# Patient Record
Sex: MALE | Race: WHITE | NOT HISPANIC OR LATINO
[De-identification: names, ages, dates, MRNs, and addresses within clinical notes are randomized per-mention and may not be internally consistent; named-entity substitution may affect disease eponyms.]

---

## 2019-02-15 ENCOUNTER — APPOINTMENT (OUTPATIENT)
Dept: GERIATRICS | Facility: CLINIC | Age: 74
End: 2019-02-15
Payer: MEDICARE

## 2019-02-15 VITALS
HEART RATE: 59 BPM | HEIGHT: 73 IN | WEIGHT: 193.19 LBS | TEMPERATURE: 96.7 F | RESPIRATION RATE: 15 BRPM | OXYGEN SATURATION: 96 % | BODY MASS INDEX: 25.6 KG/M2 | SYSTOLIC BLOOD PRESSURE: 138 MMHG | DIASTOLIC BLOOD PRESSURE: 60 MMHG

## 2019-02-15 DIAGNOSIS — I73.9 PERIPHERAL VASCULAR DISEASE, UNSPECIFIED: ICD-10-CM

## 2019-02-15 LAB
BASOPHILS # BLD AUTO: 0.02 K/UL
BASOPHILS NFR BLD AUTO: 0.2 %
EOSINOPHIL # BLD AUTO: 0.13 K/UL
EOSINOPHIL NFR BLD AUTO: 1.3 %
HBA1C MFR BLD HPLC: 6.4 %
HCT VFR BLD CALC: 38 %
HGB BLD-MCNC: 11.8 G/DL
IMM GRANULOCYTES NFR BLD AUTO: 0.1 %
LYMPHOCYTES # BLD AUTO: 2.11 K/UL
LYMPHOCYTES NFR BLD AUTO: 21.5 %
MAN DIFF?: NORMAL
MCHC RBC-ENTMCNC: 29.6 PG
MCHC RBC-ENTMCNC: 31.1 GM/DL
MCV RBC AUTO: 95.5 FL
MONOCYTES # BLD AUTO: 0.6 K/UL
MONOCYTES NFR BLD AUTO: 6.1 %
NEUTROPHILS # BLD AUTO: 6.96 K/UL
NEUTROPHILS NFR BLD AUTO: 70.8 %
PLATELET # BLD AUTO: 201 K/UL
RBC # BLD: 3.98 M/UL
RBC # FLD: 15.8 %
WBC # FLD AUTO: 9.83 K/UL

## 2019-02-15 PROCEDURE — 99204 OFFICE O/P NEW MOD 45 MIN: CPT | Mod: GC

## 2019-02-15 RX ORDER — INSULIN DETEMIR 100 [IU]/ML
100 INJECTION, SOLUTION SUBCUTANEOUS
Refills: 0 | Status: DISCONTINUED | COMMUNITY
Start: 2019-02-15 | End: 2019-02-15

## 2019-02-15 NOTE — PHYSICAL EXAM
[General Appearance - In No Acute Distress] : in no acute distress [General Appearance - Well Nourished] : well nourished [Sclera] : the sclera and conjunctiva were normal [PERRL With Normal Accommodation] : pupils were equal in size, round, and reactive to light [Extraocular Movements] : extraocular movements were intact [Normal Oral Mucosa] : normal oral mucosa [Outer Ear] : the ears and nose were normal in appearance [] : no respiratory distress [Respiration, Rhythm And Depth] : normal respiratory rhythm and effort [Exaggerated Use Of Accessory Muscles For Inspiration] : no accessory muscle use [Auscultation Breath Sounds / Voice Sounds] : lungs were clear to auscultation bilaterally [Bowel Sounds] : normal bowel sounds [Abdomen Soft] : soft [Abdomen Tenderness] : non-tender [No CVA Tenderness] : no ~M costovertebral angle tenderness [Nail Clubbing] : no clubbing  or cyanosis of the fingernails [Involuntary Movements] : no involuntary movements were seen [Skin Color & Pigmentation] : normal skin color and pigmentation [No Focal Deficits] : no focal deficits [FreeTextEntry1] : reduced arm swing, mild shuffling gait

## 2019-02-19 LAB
25(OH)D3 SERPL-MCNC: 37.5 NG/ML
ALBUMIN SERPL ELPH-MCNC: 4.4 G/DL
ALP BLD-CCNC: 153 U/L
ALT SERPL-CCNC: 25 U/L
ANION GAP SERPL CALC-SCNC: 16 MMOL/L
AST SERPL-CCNC: 35 U/L
BILIRUB SERPL-MCNC: 0.5 MG/DL
BUN SERPL-MCNC: 20 MG/DL
CALCIUM SERPL-MCNC: 9.5 MG/DL
CHLORIDE SERPL-SCNC: 106 MMOL/L
CO2 SERPL-SCNC: 20 MMOL/L
CREAT SERPL-MCNC: 1.88 MG/DL
FOLATE SERPL-MCNC: 19.1 NG/ML
GLUCOSE SERPL-MCNC: 59 MG/DL
LDLC SERPL DIRECT ASSAY-MCNC: 40 MG/DL
POTASSIUM SERPL-SCNC: 4.8 MMOL/L
PROT SERPL-MCNC: 7.4 G/DL
SODIUM SERPL-SCNC: 142 MMOL/L
T PALLIDUM AB SER QL IA: NEGATIVE
TSH SERPL-ACNC: 3.76 UIU/ML
VIT B12 SERPL-MCNC: 404 PG/ML

## 2019-02-22 ENCOUNTER — APPOINTMENT (OUTPATIENT)
Dept: GERIATRICS | Facility: CLINIC | Age: 74
End: 2019-02-22
Payer: MEDICARE

## 2019-02-22 VITALS
DIASTOLIC BLOOD PRESSURE: 70 MMHG | HEART RATE: 59 BPM | SYSTOLIC BLOOD PRESSURE: 140 MMHG | TEMPERATURE: 97.4 F | BODY MASS INDEX: 25.07 KG/M2 | OXYGEN SATURATION: 96 % | WEIGHT: 190 LBS

## 2019-02-22 PROCEDURE — 99215 OFFICE O/P EST HI 40 MIN: CPT

## 2019-02-24 ENCOUNTER — TRANSCRIPTION ENCOUNTER (OUTPATIENT)
Age: 74
End: 2019-02-24

## 2019-03-07 ENCOUNTER — TRANSCRIPTION ENCOUNTER (OUTPATIENT)
Age: 74
End: 2019-03-07

## 2019-03-15 ENCOUNTER — APPOINTMENT (OUTPATIENT)
Dept: GERIATRICS | Facility: CLINIC | Age: 74
End: 2019-03-15
Payer: MEDICARE

## 2019-03-15 VITALS
WEIGHT: 187.2 LBS | BODY MASS INDEX: 24.7 KG/M2 | HEART RATE: 65 BPM | SYSTOLIC BLOOD PRESSURE: 136 MMHG | TEMPERATURE: 97.5 F | OXYGEN SATURATION: 95 % | DIASTOLIC BLOOD PRESSURE: 50 MMHG

## 2019-03-15 PROCEDURE — 99214 OFFICE O/P EST MOD 30 MIN: CPT | Mod: GC

## 2019-03-15 NOTE — ASSESSMENT
[FreeTextEntry1] : Patient is a 72 y/o M w/ a PMHx of T2DM, HTN, HLD, and anxiety who presents to clinic for follow up.\par \par # T2DM \par - FS recorded at patient's [facility] were reviewed and were grossly stable. No episodes of hypoglycemia were seen \par - Will c/w current management for now (Metformin 500mg BID) and will review patient's FS again at his next visit\par - Eduardo check Hemoglobin A1c today\par \par # Short-term memory impairment\par - Vitamin B12, Folate, and TSH were WNL and RPR was negative in 2/2019.\par - Patient is currently in the memory unit at his [facility] which has been helpful per the nurse manager at the facility\par - Will start Donepezil 5mg QHS\par \par # Weight loss\par - Patient's sisters expressed concern regarding patient's recent weight loss. Spoke with patient's [facility]'s nurse manager who reports that patient can forget to eat at times. No other B-type symptoms were reported\par - Strongly encouraged PO intake and to monitor if patient is forgetting meals\par - Will check CBC, CMP, Ferritin, Iron studies, and MMA/Homocysteine levels\par - Requested that patient RTC in 4 weeks in order to monitor his progress\par \par # Depression/anxiety\par - Patient and his sisters are unsure who prescribes patient's anxiety/depression medications. His sisters believe it was prescribed during a hospitalization and he has remained on these medications since discharge.\par - Will stop Bupropion and start Escitalopram 5mg QD. C/w Buspirone\par \par # Occasional LE pain\par - Can be associated with an unsteady gait. Physical Therapy referral was provided\par - Fall precautions were strictly emphasized.\par - Recommended that patient can take Tylenol PRN for pain\par \par # HTN - Acceptable\par - C/w Lisinopril and Toprol XL\par \par RTC in 4 weeks for follow-up

## 2019-03-15 NOTE — PHYSICAL EXAM
[General Appearance - Alert] : alert [General Appearance - In No Acute Distress] : in no acute distress [Sclera] : the sclera and conjunctiva were normal [Extraocular Movements] : extraocular movements were intact [No Oral Pallor] : no oral pallor [No Oral Cyanosis] : no oral cyanosis [Neck Appearance] : the appearance of the neck was normal [Respiration, Rhythm And Depth] : normal respiratory rhythm and effort [Exaggerated Use Of Accessory Muscles For Inspiration] : no accessory muscle use [Nail Clubbing] : no clubbing  or cyanosis of the fingernails [Involuntary Movements] : no involuntary movements were seen [] : no rash [FreeTextEntry1] : short term memory impairment

## 2019-03-15 NOTE — ASSESSMENT
[FreeTextEntry1] : dm2:  continue off levemir.  decrease metforminto 500mg bid for crcl.  f/u sugars next week, and visit in 3 weeks. \par \par memory impairment:  \par mri brain from 2/2018:no NPH,pronounced volume loss bilateral temporal lobes and superior aspect of the frontal and parietal lobes.\par -short term memory loss.  normal TSH, B12, neg rpr.\par -also with depression and anxiety\par -discussed possible referral to neurology for additional imaging to assist with distinguishing memory loss from depression vs dementia.  previous mri is c/w volume loss.  also recent adjustment in his dm2 medications is profound hypoglycemic episodes; now improved.\par \par htn: controlled c/w current meds\par tobacco use: counselled on smoking cessation,  remains on bupropion for the indication of smoking cessation, however only on 150mg once daily and has remained on since july (making it more likely taking for depression)\par \par discussed not keeping smokes in his room, for concern about smoking in the bathroom\par advised not to leave the building unless accompanied by family member for safety concerns.\par \par \par \par \par

## 2019-03-15 NOTE — PHYSICAL EXAM
[General Appearance - Alert] : alert [General Appearance - In No Acute Distress] : in no acute distress [Sclera] : the sclera and conjunctiva were normal [Extraocular Movements] : extraocular movements were intact [Outer Ear] : the ears and nose were normal in appearance [Oropharynx] : The oropharynx was normal [Neck Appearance] : the appearance of the neck was normal [Respiration, Rhythm And Depth] : normal respiratory rhythm and effort [Exaggerated Use Of Accessory Muscles For Inspiration] : no accessory muscle use [Auscultation Breath Sounds / Voice Sounds] : lungs were clear to auscultation bilaterally [Heart Rate And Rhythm] : heart rate was normal and rhythm regular [Heart Sounds] : normal S1 and S2 [Bowel Sounds] : normal bowel sounds [Abdomen Soft] : soft [Abdomen Tenderness] : non-tender [No Spinal Tenderness] : no spinal tenderness [Nail Clubbing] : no clubbing  or cyanosis of the fingernails [Involuntary Movements] : no involuntary movements were seen [Skin Color & Pigmentation] : normal skin color and pigmentation [] : no rash [No Focal Deficits] : no focal deficits [FreeTextEntry1] : Short term memory impairment, Calm and cooperative

## 2019-03-15 NOTE — HISTORY OF PRESENT ILLNESS
[FreeTextEntry1] : 74 y/o male presents for dm2 follow up. Pt sisters are accompanying him (Amee [phone])and Sintia [phone]). Pt currently resides in Assisted living home in [place] at [place].\par \par last visit with hypoglycemia, we stopped levemir, today follow up blood sugars:\par \par 160,200,133,182, 89, 121, 148 remains on metformin 1000mg bid.\par \par spoke with Wandy of [facility]  who reports they have provided an aide to accompany patient when he leaves the building to smoke outside as he walked to the store during the night in the cold and a stranger had to bring him back to the building. \par the patient cannot remember the aide that was with him this morning.  he has a habit of walking to the store to buy a pack of smokes.\par RD also reports patient has had smoking in his bathroom at 2am.  patient denies this.  Unclear if he has pack of smokes in his room.  \par \par \par \par

## 2019-03-15 NOTE — HISTORY OF PRESENT ILLNESS
[FreeTextEntry1] : Patient is a 74 y/o M w/ a PMHx of T2DM, HTN, HLD, and anxiety who presents to clinic for follow up. Patient is accompanied by his sisters (Amee [phone]) and Sintia [phone]). Pt currently resides in Assisted living home at [place].\par \par Patient was last seen in clinic on 2/22/19. Patient's diabetes regimen was adjusted last month due to episodes of hypoglycemia. His Insulin (Levemir) was discontinued and he is taking Metformin 500mg BID. Patient's AM FS are checked at his [facility], but patient and his sister are unsure what they were. Patient's facility faxed a Hemoglobin A1c result from 2/20/19 which was 6.4%.\par \par In addition, patient was transferred to a Memory Unit in his [facility] recently due to episodes of wandering and safety concerns. Patient's daughters reports that patient has not wandered since the transfer, but he continues to have short-term memory difficulty. He states that he is adjusting well to the new unit and he has been able to sleep well. Patient continues to smoke since his last visit. There is a space available in his new unit where smoking is allowed. He states that he smokes ~ 3-4 cigarettes per day.\par \par Furthermore, patient's sister report that patient has been having weight loss over the last few months. They state that his appetite has been diminished lately. Patient denies any recent nausea, vomiting, diarrhea, or abdominal pain. No fever, chills, diaphoresis, or abdominal pain.\par \par On ROS, patient reports occasional pain in his LEs. Per patient's sisters, this has been a chronic issue. Patient reports that this pain can usually occur after prolonged periods of sitting or prolonged periods of walking. No history of falls or trauma, but his gait can occasionally be unsteady.\par \par Spoke with the Nurse Manager (Alyssa) at patient's [facility]. Patient's recent AM FS were faxed to this clinic and they fluctuated between 130s-190s (highest = 196). No hypoglycemia noted. In addition, Alyssa reports that patient has been transitioning well to his new unit. She states that patient has been more active and participating more. Discussed patient's sisters' concerns regarding weight loss. Per Alyssa, patient appears to forget to eat and occasionally needs to be reminded to do so. Alyssa reports no other concerns.

## 2019-03-18 LAB
ALBUMIN SERPL ELPH-MCNC: 4.6 G/DL
ALP BLD-CCNC: 147 U/L
ALT SERPL-CCNC: 13 U/L
ANION GAP SERPL CALC-SCNC: 14 MMOL/L
AST SERPL-CCNC: 20 U/L
BASOPHILS # BLD AUTO: 0.04 K/UL
BASOPHILS NFR BLD AUTO: 0.4 %
BILIRUB SERPL-MCNC: 0.5 MG/DL
BUN SERPL-MCNC: 19 MG/DL
CALCIUM SERPL-MCNC: 9.9 MG/DL
CHLORIDE SERPL-SCNC: 103 MMOL/L
CO2 SERPL-SCNC: 25 MMOL/L
CREAT SERPL-MCNC: 2.1 MG/DL
EOSINOPHIL # BLD AUTO: 0.17 K/UL
EOSINOPHIL NFR BLD AUTO: 1.9 %
FERRITIN SERPL-MCNC: 77 NG/ML
GLUCOSE SERPL-MCNC: 130 MG/DL
HBA1C MFR BLD HPLC: 7 %
HCT VFR BLD CALC: 39.7 %
HGB BLD-MCNC: 12.3 G/DL
IMM GRANULOCYTES NFR BLD AUTO: 0.3 %
IRON SATN MFR SERPL: 13 %
IRON SERPL-MCNC: 52 UG/DL
LYMPHOCYTES # BLD AUTO: 2.19 K/UL
LYMPHOCYTES NFR BLD AUTO: 24.4 %
MAN DIFF?: NORMAL
MCHC RBC-ENTMCNC: 29.3 PG
MCHC RBC-ENTMCNC: 31 GM/DL
MCV RBC AUTO: 94.5 FL
MONOCYTES # BLD AUTO: 0.85 K/UL
MONOCYTES NFR BLD AUTO: 9.5 %
NEUTROPHILS # BLD AUTO: 5.71 K/UL
NEUTROPHILS NFR BLD AUTO: 63.5 %
PLATELET # BLD AUTO: 168 K/UL
POTASSIUM SERPL-SCNC: 4.8 MMOL/L
PROT SERPL-MCNC: 7.4 G/DL
RBC # BLD: 4.2 M/UL
RBC # FLD: 16.2 %
SODIUM SERPL-SCNC: 142 MMOL/L
TIBC SERPL-MCNC: 387 UG/DL
UIBC SERPL-MCNC: 335 UG/DL
WBC # FLD AUTO: 8.99 K/UL

## 2019-03-21 LAB
HOMOCYSTEINE LEVEL: 28.7 UMOL/L
METHYLMALONIC ACID LEVEL: 366 NMOL/L

## 2019-04-10 ENCOUNTER — RX RENEWAL (OUTPATIENT)
Age: 74
End: 2019-04-10

## 2019-04-12 ENCOUNTER — APPOINTMENT (OUTPATIENT)
Dept: GERIATRICS | Facility: CLINIC | Age: 74
End: 2019-04-12
Payer: MEDICARE

## 2019-04-12 VITALS
RESPIRATION RATE: 15 BRPM | OXYGEN SATURATION: 97 % | HEART RATE: 54 BPM | HEIGHT: 73 IN | SYSTOLIC BLOOD PRESSURE: 110 MMHG | BODY MASS INDEX: 24.97 KG/M2 | TEMPERATURE: 97.6 F | WEIGHT: 188.38 LBS | DIASTOLIC BLOOD PRESSURE: 60 MMHG

## 2019-04-12 PROCEDURE — 99214 OFFICE O/P EST MOD 30 MIN: CPT

## 2019-04-12 RX ORDER — ESCITALOPRAM OXALATE 5 MG/1
5 TABLET ORAL DAILY
Qty: 30 | Refills: 3 | Status: DISCONTINUED | COMMUNITY
Start: 2019-03-15 | End: 2019-04-12

## 2019-04-12 NOTE — REVIEW OF SYSTEMS
[Fever] : no fever [Chills] : no chills [Eye Pain] : no eye pain [Sore Throat] : no sore throat [Nasal Discharge] : no nasal discharge [Red Eyes] : eyes not red [Chest Pain] : no chest pain [Palpitations] : no palpitations [Shortness Of Breath] : no shortness of breath [Vomiting] : no vomiting [Cough] : no cough [Abdominal Pain] : no abdominal pain [Limb Swelling] : no limb swelling [Skin Lesions] : no skin lesions [Diarrhea] : no diarrhea [Fainting] : no fainting [Itching] : no itching [Dizziness] : no dizziness [de-identified] : + Short-term memory difficulty [FreeTextEntry9] : + Occasional LE pain [FreeTextEntry2] : No diaphoresis, + Weight loss

## 2019-04-12 NOTE — HISTORY OF PRESENT ILLNESS
[FreeTextEntry1] : Patient is a 72 y/o M w/ a PMHx of T2DM, HTN, HLD, and anxiety who presents to clinic for follow up. Patient is accompanied by his sisters (Amee [phone]) and Sintia [phone]). Pt currently resides in Assisted living home at [facility].\par \par dm2: patients alc was 7.0% in feb.  awaiting correction to fax results of his finger sticks.\par \par patient remains in the Memory Unit in his [facility] due to episodes of wandering and safety concerns.  smoking less now which is allowed in the facility under supervision.  sisters reports they have not bought him any additional cigarettes since he moved in.\par \par weight has been stable.  denies ab pain, nausea, or decreased appetite.\par \par continues with PT at the [facility].\par \tiffany Spoke with the Nurse Manager (Alyssa) at patient's [facility].

## 2019-04-12 NOTE — ASSESSMENT
[FreeTextEntry1] : Patient is a 72 y/o M w/ a PMHx of T2DM, HTN, HLD, and anxiety who presents to clinic for follow up.\par \par # Depression/anxiety\par - worsening mood since last visit with change in antidepressants.  plan to taper off lexapro (qod x 10days then stop) and add back bupropion 150mg.   C/w Buspirone\par \par # T2DM \par - FS recorded at patient's snf were reviewed and were grossly stable. No episodes of hypoglycemia were seen \par - Will c/w current management for now (Metformin 500mg BID) and will review patient's FS again at his next visit\par - last Hemoglobin A1c 7.0\par \par # Dementia:  MMSE 12/30 on 2/15/19\par - Vitamin B12, Folate, and TSH were WNL and RPR was negative in 2/2019.\par - Patient is currently in the memory unit at his snf for wandering and safety\par - Continue with Donepezil 5mg QHS\par \par # Weight loss\par - Patient's sisters expressed concern regarding patient's recent weight loss. has been stable since last visit. suspect improved eating on the memory unit.  will continue to monitor.  \par \par \par # Occasional LE pain and slow unsteady gait:\par - Can be associated with an unsteady gait. Physical Therapy referral was provided\par - Fall precautions\par - Recommended that patient can take Tylenol PRN for pain\par \par # HTN -lower side today and hr of 54.  \par - decrease metoprolol from 50mg to 25mg\par - C/w Lisinopril \par \par RTC in 8 weeks for follow-up

## 2019-04-12 NOTE — PHYSICAL EXAM
[General Appearance - Alert] : alert [Sclera] : the sclera and conjunctiva were normal [General Appearance - In No Acute Distress] : in no acute distress [Oropharynx] : The oropharynx was normal [Extraocular Movements] : extraocular movements were intact [Outer Ear] : the ears and nose were normal in appearance [Neck Appearance] : the appearance of the neck was normal [Respiration, Rhythm And Depth] : normal respiratory rhythm and effort [Exaggerated Use Of Accessory Muscles For Inspiration] : no accessory muscle use [Auscultation Breath Sounds / Voice Sounds] : lungs were clear to auscultation bilaterally [Heart Rate And Rhythm] : heart rate was normal and rhythm regular [Bowel Sounds] : normal bowel sounds [Heart Sounds] : normal S1 and S2 [Abdomen Soft] : soft [Abdomen Tenderness] : non-tender [No Spinal Tenderness] : no spinal tenderness [Nail Clubbing] : no clubbing  or cyanosis of the fingernails [Involuntary Movements] : no involuntary movements were seen [Skin Color & Pigmentation] : normal skin color and pigmentation [] : no rash [No Focal Deficits] : no focal deficits [FreeTextEntry1] : Short term memory impairment, Calm and cooperative

## 2019-05-30 ENCOUNTER — RX RENEWAL (OUTPATIENT)
Age: 74
End: 2019-05-30

## 2019-06-07 ENCOUNTER — TRANSCRIPTION ENCOUNTER (OUTPATIENT)
Age: 74
End: 2019-06-07

## 2019-06-10 ENCOUNTER — TRANSCRIPTION ENCOUNTER (OUTPATIENT)
Age: 74
End: 2019-06-10

## 2019-06-14 ENCOUNTER — LABORATORY RESULT (OUTPATIENT)
Age: 74
End: 2019-06-14

## 2019-06-14 ENCOUNTER — APPOINTMENT (OUTPATIENT)
Dept: GERIATRICS | Facility: CLINIC | Age: 74
End: 2019-06-14
Payer: MEDICARE

## 2019-06-14 VITALS
HEART RATE: 59 BPM | DIASTOLIC BLOOD PRESSURE: 60 MMHG | BODY MASS INDEX: 24.8 KG/M2 | SYSTOLIC BLOOD PRESSURE: 110 MMHG | OXYGEN SATURATION: 98 % | HEIGHT: 73 IN | WEIGHT: 187.13 LBS | RESPIRATION RATE: 14 BRPM | TEMPERATURE: 97.6 F

## 2019-06-14 DIAGNOSIS — H61.20 IMPACTED CERUMEN, UNSPECIFIED EAR: ICD-10-CM

## 2019-06-14 PROCEDURE — 99215 OFFICE O/P EST HI 40 MIN: CPT

## 2019-06-14 RX ORDER — METOPROLOL SUCCINATE 25 MG/1
25 TABLET, EXTENDED RELEASE ORAL DAILY
Qty: 1 | Refills: 4 | Status: DISCONTINUED | COMMUNITY
Start: 2019-02-15 | End: 2019-06-14

## 2019-06-16 LAB
ALBUMIN SERPL ELPH-MCNC: 4.8 G/DL
ALP BLD-CCNC: 131 U/L
ALT SERPL-CCNC: 38 U/L
ANION GAP SERPL CALC-SCNC: 14 MMOL/L
AST SERPL-CCNC: 27 U/L
BASOPHILS # BLD AUTO: 0.04 K/UL
BASOPHILS NFR BLD AUTO: 0.4 %
BILIRUB SERPL-MCNC: 0.3 MG/DL
BUN SERPL-MCNC: 29 MG/DL
CALCIUM SERPL-MCNC: 9.9 MG/DL
CHLORIDE SERPL-SCNC: 102 MMOL/L
CO2 SERPL-SCNC: 25 MMOL/L
CREAT SERPL-MCNC: 2.13 MG/DL
EOSINOPHIL # BLD AUTO: 0.29 K/UL
EOSINOPHIL NFR BLD AUTO: 2.6 %
ESTIMATED AVERAGE GLUCOSE: 169 MG/DL
GLUCOSE SERPL-MCNC: 144 MG/DL
HBA1C MFR BLD HPLC: 7.5 %
HCT VFR BLD CALC: 37.1 %
HGB BLD-MCNC: 11.8 G/DL
IMM GRANULOCYTES NFR BLD AUTO: 0.3 %
LYMPHOCYTES # BLD AUTO: 2.11 K/UL
LYMPHOCYTES NFR BLD AUTO: 19.2 %
MAN DIFF?: NORMAL
MCHC RBC-ENTMCNC: 30.6 PG
MCHC RBC-ENTMCNC: 31.8 GM/DL
MCV RBC AUTO: 96.4 FL
MONOCYTES # BLD AUTO: 0.87 K/UL
MONOCYTES NFR BLD AUTO: 7.9 %
NEUTROPHILS # BLD AUTO: 7.65 K/UL
NEUTROPHILS NFR BLD AUTO: 69.6 %
PLATELET # BLD AUTO: 165 K/UL
POTASSIUM SERPL-SCNC: 5.2 MMOL/L
PROT SERPL-MCNC: 7.4 G/DL
RBC # BLD: 3.85 M/UL
RBC # FLD: 15.3 %
SODIUM SERPL-SCNC: 141 MMOL/L
TSH SERPL-ACNC: 7.38 UIU/ML
WBC # FLD AUTO: 10.99 K/UL

## 2019-08-14 NOTE — HISTORY OF PRESENT ILLNESS
[FreeTextEntry1] : Patient is a 72 y/o M w/ a PMHx of T2DM, HTN, HLD, and anxiety who presents to clinic for follow up. Patient is accompanied by his sisters (Amee [phone]) and Sintia [phone]). Pt currently resides in Assisted living home at [place] within the memory unit.\par \par patient remains in the Memory Unit in his [facility] due to episodes of wandering and safety concerns.  he has a private  that visits him for 1 hour each day.  Sister reports patient frequently reports feeling down.  However today patient reports he feels "fine" today.\par \par patient does not participate in the socialization activites within the [facility].

## 2019-08-14 NOTE — SOCIAL HISTORY
[Two or more falls in past year] : Patient reported two or more falls in the past year [de-identified] : independent

## 2019-08-14 NOTE — REVIEW OF SYSTEMS
[Loss Of Hearing] : hearing loss [Feeling Tired] : feeling tired [Negative] : Neurological [Fever] : no fever [Chills] : no chills

## 2019-08-14 NOTE — PHYSICAL EXAM
[General Appearance - In No Acute Distress] : in no acute distress [General Appearance - Alert] : alert [Sclera] : the sclera and conjunctiva were normal [No Oral Pallor] : no oral pallor [Extraocular Movements] : extraocular movements were intact [Outer Ear] : the ears and nose were normal in appearance [Neck Appearance] : the appearance of the neck was normal [Respiration, Rhythm And Depth] : normal respiratory rhythm and effort [Exaggerated Use Of Accessory Muscles For Inspiration] : no accessory muscle use [Auscultation Breath Sounds / Voice Sounds] : lungs were clear to auscultation bilaterally [Heart Sounds] : normal S1 and S2 [Heart Rate And Rhythm] : heart rate was normal and rhythm regular [Edema] : there was no peripheral edema [Bowel Sounds] : normal bowel sounds [Abdomen Soft] : soft [Abdomen Tenderness] : non-tender [Veins - Varicosity Changes] : there were no varicosital changes [No Spinal Tenderness] : no spinal tenderness [Nail Clubbing] : no clubbing  or cyanosis of the fingernails [Skin Color & Pigmentation] : normal skin color and pigmentation [Involuntary Movements] : no involuntary movements were seen [] : no rash [No Focal Deficits] : no focal deficits [FreeTextEntry1] : Short term memory impairment, Calm and cooperative

## 2019-08-14 NOTE — ASSESSMENT
[FreeTextEntry1] : Patient is a 72 y/o M w/ a PMHx of T2DM, HTN, HLD, and anxiety who presents to clinic for follow up.\par \par # Depression/anxiety\par - worsening mood since last visit increase  bupropion 150mg bid.   taper off Buspirone.  \par -previously started on lexapro, but without effect and changed back to bupropion\par \par # T2DM : worse neuropathy: increase to 300mg qhs. c/w ace\par - recheck a1c.\par -c/w Metformin 500mg BID\par - last Hemoglobin A1c 7.0\par \par # Dementia:  MMSE 12/30 on 2/15/19\par - Vitamin B12, Folate, and TSH were WNL and RPR was negative in 2/2019.\par - Patient is currently in the memory unit at his [facility] for wandering and safety\par - Increase Donepezil 10mg QHS, monitor HR\par \par # HTN:  remains on ACE and lowered dose metoprolol succinate with borderline HR.  \par -will change to metoprolol tartrate and decrease dose.  f/u hr and BP at next visit \par \par #HLD: c/w Crestor\par \par referred to ENT for impacted cerumen.\par CKD: Cr near baseline of 2.1\par subclinical hypothyroidism.  tsh 7, free t4 wnl.  repeat at next visit.\par

## 2019-08-26 ENCOUNTER — TRANSCRIPTION ENCOUNTER (OUTPATIENT)
Age: 74
End: 2019-08-26

## 2019-08-26 ENCOUNTER — RX RENEWAL (OUTPATIENT)
Age: 74
End: 2019-08-26

## 2019-09-16 ENCOUNTER — LABORATORY RESULT (OUTPATIENT)
Age: 74
End: 2019-09-16

## 2019-09-16 ENCOUNTER — APPOINTMENT (OUTPATIENT)
Dept: GERIATRICS | Facility: CLINIC | Age: 74
End: 2019-09-16
Payer: MEDICARE

## 2019-09-16 VITALS
TEMPERATURE: 98.7 F | RESPIRATION RATE: 14 BRPM | WEIGHT: 201.06 LBS | HEART RATE: 67 BPM | HEIGHT: 73 IN | DIASTOLIC BLOOD PRESSURE: 60 MMHG | BODY MASS INDEX: 26.65 KG/M2 | OXYGEN SATURATION: 97 % | SYSTOLIC BLOOD PRESSURE: 118 MMHG

## 2019-09-16 PROCEDURE — 99214 OFFICE O/P EST MOD 30 MIN: CPT

## 2019-09-18 LAB
ALBUMIN SERPL ELPH-MCNC: 4.5 G/DL
ALP BLD-CCNC: 164 U/L
ALT SERPL-CCNC: 22 U/L
ANION GAP SERPL CALC-SCNC: 14 MMOL/L
APPEARANCE: CLEAR
AST SERPL-CCNC: 22 U/L
BASOPHILS # BLD AUTO: 0.04 K/UL
BASOPHILS NFR BLD AUTO: 0.4 %
BILIRUB SERPL-MCNC: 0.4 MG/DL
BILIRUBIN URINE: NEGATIVE
BLOOD URINE: NEGATIVE
BUN SERPL-MCNC: 24 MG/DL
CALCIUM SERPL-MCNC: 9.6 MG/DL
CHLORIDE SERPL-SCNC: 102 MMOL/L
CO2 SERPL-SCNC: 22 MMOL/L
COLOR: YELLOW
CREAT SERPL-MCNC: 2.62 MG/DL
CREAT SPEC-SCNC: 163 MG/DL
CREAT/PROT UR: 0.4 RATIO
EOSINOPHIL # BLD AUTO: 0.27 K/UL
EOSINOPHIL NFR BLD AUTO: 3 %
ESTIMATED AVERAGE GLUCOSE: 189 MG/DL
FERRITIN SERPL-MCNC: 174 NG/ML
FOLATE SERPL-MCNC: 19.2 NG/ML
GLUCOSE QUALITATIVE U: NEGATIVE
GLUCOSE SERPL-MCNC: 129 MG/DL
HBA1C MFR BLD HPLC: 8.2 %
HCT VFR BLD CALC: 33.3 %
HGB BLD-MCNC: 11.1 G/DL
IMM GRANULOCYTES NFR BLD AUTO: 0.2 %
IRON SATN MFR SERPL: 31 %
IRON SERPL-MCNC: 100 UG/DL
KETONES URINE: NEGATIVE
LEUKOCYTE ESTERASE URINE: ABNORMAL
LYMPHOCYTES # BLD AUTO: 1.98 K/UL
LYMPHOCYTES NFR BLD AUTO: 21.8 %
MAN DIFF?: NORMAL
MCHC RBC-ENTMCNC: 32.9 PG
MCHC RBC-ENTMCNC: 33.3 GM/DL
MCV RBC AUTO: 98.8 FL
MONOCYTES # BLD AUTO: 0.86 K/UL
MONOCYTES NFR BLD AUTO: 9.5 %
NEUTROPHILS # BLD AUTO: 5.93 K/UL
NEUTROPHILS NFR BLD AUTO: 65.1 %
NITRITE URINE: NEGATIVE
PH URINE: 6
PLATELET # BLD AUTO: 206 K/UL
POTASSIUM SERPL-SCNC: 5 MMOL/L
PROT SERPL-MCNC: 7.2 G/DL
PROT UR-MCNC: 64 MG/DL
PROTEIN URINE: ABNORMAL
RBC # BLD: 3.37 M/UL
RBC # FLD: 13.9 %
SODIUM SERPL-SCNC: 138 MMOL/L
SPECIFIC GRAVITY URINE: 1.02
TIBC SERPL-MCNC: 324 UG/DL
TSH SERPL-ACNC: 6.8 UIU/ML
UIBC SERPL-MCNC: 224 UG/DL
UROBILINOGEN URINE: NORMAL
VIT B12 SERPL-MCNC: 442 PG/ML
WBC # FLD AUTO: 9.1 K/UL

## 2019-09-18 NOTE — HISTORY OF PRESENT ILLNESS
[FreeTextEntry1] : Patient is a 74 y/o M w/ a PMHx of T2DM, HTN, HLD, and anxiety who presents to clinic for follow up. Patient is accompanied by his sisters (Amee [phone]) and Sintia [phone]). Pt currently resides in Assisted living home at [place].\par \par dm2: patients alc was 7.0% in feb.  awaiting residential to fax results of his finger sticks.\par \par patient remains in the Memory Unit in his senior living due to episodes of wandering and safety concerns.  smoking less now which is allowed in the facility under supervision.  sisters reports they have not bought him any additional cigarettes since he moved in.\par \par weight has been stable.  denies ab pain, nausea, or decreased appetite.\par \par continues with PT at the senior living.\par \tiffany Spoke with the Nurse Manager (Alyssa) at patient's [facility].

## 2019-09-18 NOTE — PHYSICAL EXAM
[General Appearance - In No Acute Distress] : in no acute distress [General Appearance - Alert] : alert [Sclera] : the sclera and conjunctiva were normal [Extraocular Movements] : extraocular movements were intact [Outer Ear] : the ears and nose were normal in appearance [Oropharynx] : The oropharynx was normal [Neck Appearance] : the appearance of the neck was normal [Respiration, Rhythm And Depth] : normal respiratory rhythm and effort [Exaggerated Use Of Accessory Muscles For Inspiration] : no accessory muscle use [Auscultation Breath Sounds / Voice Sounds] : lungs were clear to auscultation bilaterally [Heart Rate And Rhythm] : heart rate was normal and rhythm regular [Bowel Sounds] : normal bowel sounds [Heart Sounds] : normal S1 and S2 [Abdomen Soft] : soft [Abdomen Tenderness] : non-tender [No Spinal Tenderness] : no spinal tenderness [Nail Clubbing] : no clubbing  or cyanosis of the fingernails [Involuntary Movements] : no involuntary movements were seen [Skin Color & Pigmentation] : normal skin color and pigmentation [] : no rash [No Focal Deficits] : no focal deficits [FreeTextEntry1] : Short term memory impairment, Calm and cooperative

## 2019-09-18 NOTE — REVIEW OF SYSTEMS
[Eye Pain] : no eye pain [Chills] : no chills [Fever] : no fever [Red Eyes] : eyes not red [Nasal Discharge] : no nasal discharge [Sore Throat] : no sore throat [Chest Pain] : no chest pain [Palpitations] : no palpitations [Shortness Of Breath] : no shortness of breath [Cough] : no cough [Vomiting] : no vomiting [Diarrhea] : no diarrhea [Abdominal Pain] : no abdominal pain [Limb Swelling] : no limb swelling [Skin Lesions] : no skin lesions [Itching] : no itching [Fainting] : no fainting [Dizziness] : no dizziness [de-identified] : + Short-term memory difficulty [FreeTextEntry2] : No diaphoresis, + Weight loss [FreeTextEntry9] : + Occasional LE pain

## 2019-09-18 NOTE — ASSESSMENT
[FreeTextEntry1] : # Depression/anxiety:  patient's get upset with discussion about antidepressant.; poor insight into disease\par - c/w bupropion 150mg bid   \par -C/w Buspirone\par \par # T2DM \par - advised to stop FS\par -stop metformin due to RUI vs progression of CKD\par -repeat al1c uncontrolled at 8.5%\par -trial of tradjenta, if unable to get a1c to target, will change to victoza\par -repeat kidney function in 3 weeks.\par \par # Dementia:  MMSE 12/30 on 2/15/19\par - Vitamin B12, Folate, and TSH were WNL and RPR was negative in 2/2019.\par - Patient is currently in the memory unit at his [facility] for wandering and safety\par - Continue with Donepezil 10mg QHS\par \par # unsteady gait:\par - Physical Therapy referral \par - Fall precautions\par - Recommended that patient join in exercise class within assisted living\par \par # HTN -stable, hr improved \par - c/w decreased metoprolol 25mg\par - C/w Lisinopril \par \par #CKD:\par repeat cr in 3 weeks\par \par #immunizations:\par -flu and shingrix recommended.  (to get at [facility])\par RTC in 3 months\par \par called and discussed with Alyssa at [facility] assisted living\par called and discussed results with sister cary

## 2019-10-18 ENCOUNTER — RX RENEWAL (OUTPATIENT)
Age: 74
End: 2019-10-18

## 2019-12-13 ENCOUNTER — APPOINTMENT (OUTPATIENT)
Dept: GERIATRICS | Facility: CLINIC | Age: 74
End: 2019-12-13
Payer: MEDICARE

## 2019-12-13 VITALS
DIASTOLIC BLOOD PRESSURE: 60 MMHG | SYSTOLIC BLOOD PRESSURE: 118 MMHG | WEIGHT: 199.13 LBS | OXYGEN SATURATION: 99 % | BODY MASS INDEX: 26.39 KG/M2 | TEMPERATURE: 97.8 F | HEART RATE: 65 BPM | RESPIRATION RATE: 14 BRPM | HEIGHT: 73 IN

## 2019-12-13 DIAGNOSIS — E55.9 VITAMIN D DEFICIENCY, UNSPECIFIED: ICD-10-CM

## 2019-12-13 LAB
BASOPHILS # BLD AUTO: 0.06 K/UL
BASOPHILS NFR BLD AUTO: 0.6 %
EOSINOPHIL # BLD AUTO: 0.56 K/UL
EOSINOPHIL NFR BLD AUTO: 5.8 %
HCT VFR BLD CALC: 37.8 %
HGB BLD-MCNC: 12.4 G/DL
IMM GRANULOCYTES NFR BLD AUTO: 0.3 %
LYMPHOCYTES # BLD AUTO: 1.79 K/UL
LYMPHOCYTES NFR BLD AUTO: 18.5 %
MAN DIFF?: NORMAL
MCHC RBC-ENTMCNC: 32.6 PG
MCHC RBC-ENTMCNC: 32.8 GM/DL
MCV RBC AUTO: 99.5 FL
MONOCYTES # BLD AUTO: 0.85 K/UL
MONOCYTES NFR BLD AUTO: 8.8 %
NEUTROPHILS # BLD AUTO: 6.4 K/UL
NEUTROPHILS NFR BLD AUTO: 66 %
PLATELET # BLD AUTO: 188 K/UL
RBC # BLD: 3.8 M/UL
RBC # FLD: 13.2 %
WBC # FLD AUTO: 9.69 K/UL

## 2019-12-13 PROCEDURE — 99214 OFFICE O/P EST MOD 30 MIN: CPT

## 2019-12-13 RX ORDER — METFORMIN HYDROCHLORIDE 500 MG/1
500 TABLET, COATED ORAL
Qty: 60 | Refills: 3 | Status: DISCONTINUED | COMMUNITY
Start: 2019-02-15 | End: 2019-12-13

## 2019-12-13 NOTE — SOCIAL HISTORY
[Independent] : transferring [Some assistance needed] : bathing [Full assistance needed] : managing finances

## 2019-12-13 NOTE — HISTORY OF PRESENT ILLNESS
[Moderate] : Stage: Moderate [Worse] : Status: Worse [Memory Lapses Or Loss] : worsened memory impairment [Patient Observed To Be Agitated] : worsened agitation [Sleep Disturbances] : stable sleep disturbances [] : stable wandering [FreeTextEntry1] : Patient is a 74 y/o M w/ a PMHx of T2DM, HTN, HLD, and anxiety who presents to clinic for follow up. Patient is accompanied by his sisters (Amee [phone]) and Sintia [phone]). Pt currently resides in Assisted living home at [place].\par \par dm2: trial of trajenta, but sister reports too expensive needs alt.\par \par patient remains in the Memory Unit in his care home due to episodes of wandering and safety concerns.  smoking less now which is allowed in the facility under supervision.  sisters reports they have not bought him any additional cigarettes since he moved in.\par \par weight has been stable.  denies ab pain, nausea, or decreased appetite.\par \par continues with PT at the [facility].\par \par

## 2019-12-13 NOTE — REVIEW OF SYSTEMS
[Fever] : no fever [Chills] : no chills [Eye Pain] : no eye pain [Red Eyes] : eyes not red [Nasal Discharge] : no nasal discharge [Sore Throat] : no sore throat [Palpitations] : no palpitations [Chest Pain] : no chest pain [Shortness Of Breath] : no shortness of breath [Cough] : no cough [Abdominal Pain] : no abdominal pain [Vomiting] : no vomiting [Diarrhea] : no diarrhea [Limb Swelling] : no limb swelling [Skin Lesions] : no skin lesions [Itching] : no itching [FreeTextEntry2] : No diaphoresis, + Weight loss [Fainting] : no fainting [Dizziness] : no dizziness [de-identified] : + Short-term memory difficulty [FreeTextEntry9] : + Occasional LE pain

## 2019-12-13 NOTE — ASSESSMENT
[FreeTextEntry1] : # T2DM \par -no longer having daily FS\par -stop metformin due to RUI vs progression of CKD\par -repeat al1c uncontrolled at 8.5%\par -trajenta was costly, plan to change to glipizide and has prn sugars if needed\par -blood work today\par \par # Depression/anxiety:  patient's get upset with discussion about antidepressant.; poor insight into disease\par - c/w bupropion 150mg bid   \par -C/w Buspirone\par \par \par \par # Dementia:  MMSE 12/30 on 2/15/19\par - Vitamin B12, Folate, and TSH were WNL and RPR was negative in 2/2019.\par - Patient is currently in the memory unit at his [facility] for wandering and safety\par - Continue with Donepezil 10mg QHS\par -worsening BD plan to monitor increase aide to take out of memory unit twice a week\par -if BD worsen, discussed possible need for seroquel.\par \par # unsteady gait\par - Fall precautions\par - Recommended that patient join in exercise class within assisted living\par -pt referral\par -c/w glucerna daily for additional protein\par \par # HTN -stable, hr improved \par - c/w decreased metoprolol 25mg\par - C/w Lisinopril 5mg daily\par \par #CKD:\par repeat cr \par \par \par \par #immunizations:\par upt todate with flu \par RTC in 3 months\par \par

## 2019-12-13 NOTE — PHYSICAL EXAM
[General Appearance - Alert] : alert [Extraocular Movements] : extraocular movements were intact [General Appearance - In No Acute Distress] : in no acute distress [Sclera] : the sclera and conjunctiva were normal [Oropharynx] : The oropharynx was normal [Neck Appearance] : the appearance of the neck was normal [Outer Ear] : the ears and nose were normal in appearance [Respiration, Rhythm And Depth] : normal respiratory rhythm and effort [Exaggerated Use Of Accessory Muscles For Inspiration] : no accessory muscle use [Auscultation Breath Sounds / Voice Sounds] : lungs were clear to auscultation bilaterally [Heart Rate And Rhythm] : heart rate was normal and rhythm regular [Bowel Sounds] : normal bowel sounds [Heart Sounds] : normal S1 and S2 [Abdomen Soft] : soft [Abdomen Tenderness] : non-tender [No Spinal Tenderness] : no spinal tenderness [Nail Clubbing] : no clubbing  or cyanosis of the fingernails [Skin Color & Pigmentation] : normal skin color and pigmentation [Involuntary Movements] : no involuntary movements were seen [] : no rash [No Focal Deficits] : no focal deficits [FreeTextEntry1] : slow unsteady gait

## 2019-12-16 LAB
25(OH)D3 SERPL-MCNC: 37.4 NG/ML
ALBUMIN SERPL ELPH-MCNC: 4.6 G/DL
ALP BLD-CCNC: 129 U/L
ALT SERPL-CCNC: 21 U/L
ANION GAP SERPL CALC-SCNC: 16 MMOL/L
AST SERPL-CCNC: 18 U/L
BILIRUB SERPL-MCNC: 0.3 MG/DL
BUN SERPL-MCNC: 25 MG/DL
CALCIUM SERPL-MCNC: 9.5 MG/DL
CHLORIDE SERPL-SCNC: 105 MMOL/L
CO2 SERPL-SCNC: 22 MMOL/L
CREAT SERPL-MCNC: 2.53 MG/DL
ESTIMATED AVERAGE GLUCOSE: 183 MG/DL
GLUCOSE SERPL-MCNC: 174 MG/DL
HBA1C MFR BLD HPLC: 8 %
LDLC SERPL DIRECT ASSAY-MCNC: 69 MG/DL
POTASSIUM SERPL-SCNC: 4.5 MMOL/L
PROT SERPL-MCNC: 7.1 G/DL
SODIUM SERPL-SCNC: 143 MMOL/L
TSH SERPL-ACNC: 4.2 UIU/ML

## 2019-12-16 RX ORDER — LINAGLIPTIN 5 MG/1
5 TABLET, FILM COATED ORAL DAILY
Qty: 30 | Refills: 4 | Status: DISCONTINUED | COMMUNITY
Start: 2019-09-18 | End: 2019-12-16

## 2019-12-24 ENCOUNTER — RX RENEWAL (OUTPATIENT)
Age: 74
End: 2019-12-24

## 2019-12-30 ENCOUNTER — RX RENEWAL (OUTPATIENT)
Age: 74
End: 2019-12-30

## 2020-04-20 ENCOUNTER — TRANSCRIPTION ENCOUNTER (OUTPATIENT)
Age: 75
End: 2020-04-20

## 2020-06-12 ENCOUNTER — APPOINTMENT (OUTPATIENT)
Dept: GERIATRICS | Facility: CLINIC | Age: 75
End: 2020-06-12

## 2020-07-08 ENCOUNTER — TRANSCRIPTION ENCOUNTER (OUTPATIENT)
Age: 75
End: 2020-07-08

## 2020-07-08 RX ORDER — BUSPIRONE HYDROCHLORIDE 7.5 MG/1
7.5 TABLET ORAL
Qty: 60 | Refills: 5 | Status: DISCONTINUED | COMMUNITY
Start: 2019-02-15 | End: 2020-07-08

## 2020-07-15 ENCOUNTER — TRANSCRIPTION ENCOUNTER (OUTPATIENT)
Age: 75
End: 2020-07-15

## 2020-12-02 ENCOUNTER — NON-APPOINTMENT (OUTPATIENT)
Age: 75
End: 2020-12-02

## 2020-12-02 RX ORDER — ROSUVASTATIN CALCIUM 10 MG/1
10 TABLET, FILM COATED ORAL
Qty: 30 | Refills: 5 | Status: ACTIVE | COMMUNITY
Start: 2019-02-15 | End: 1900-01-01

## 2020-12-14 RX ORDER — LISINOPRIL 5 MG/1
5 TABLET ORAL
Qty: 30 | Refills: 6 | Status: ACTIVE | COMMUNITY
Start: 2019-02-15 | End: 1900-01-01

## 2020-12-16 ENCOUNTER — TRANSCRIPTION ENCOUNTER (OUTPATIENT)
Age: 75
End: 2020-12-16

## 2021-03-03 ENCOUNTER — LABORATORY RESULT (OUTPATIENT)
Age: 76
End: 2021-03-03

## 2021-03-03 ENCOUNTER — APPOINTMENT (OUTPATIENT)
Dept: GERIATRICS | Facility: CLINIC | Age: 76
End: 2021-03-03
Payer: MEDICARE

## 2021-03-03 ENCOUNTER — NON-APPOINTMENT (OUTPATIENT)
Age: 76
End: 2021-03-03

## 2021-03-03 VITALS
RESPIRATION RATE: 14 BRPM | OXYGEN SATURATION: 99 % | WEIGHT: 185.25 LBS | SYSTOLIC BLOOD PRESSURE: 100 MMHG | DIASTOLIC BLOOD PRESSURE: 60 MMHG | TEMPERATURE: 96.3 F | BODY MASS INDEX: 24.55 KG/M2 | HEIGHT: 73 IN | HEART RATE: 57 BPM

## 2021-03-03 DIAGNOSIS — Z87.891 PERSONAL HISTORY OF NICOTINE DEPENDENCE: ICD-10-CM

## 2021-03-03 DIAGNOSIS — Z00.00 ENCOUNTER FOR GENERAL ADULT MEDICAL EXAMINATION W/OUT ABNORMAL FINDINGS: ICD-10-CM

## 2021-03-03 DIAGNOSIS — Z72.0 TOBACCO USE: ICD-10-CM

## 2021-03-03 DIAGNOSIS — R63.4 ABNORMAL WEIGHT LOSS: ICD-10-CM

## 2021-03-03 DIAGNOSIS — Z82.49 FAMILY HISTORY OF ISCHEMIC HEART DISEASE AND OTHER DISEASES OF THE CIRCULATORY SYSTEM: ICD-10-CM

## 2021-03-03 DIAGNOSIS — R41.3 OTHER AMNESIA: ICD-10-CM

## 2021-03-03 PROCEDURE — G0439: CPT | Mod: GC

## 2021-03-03 RX ORDER — METOPROLOL TARTRATE 25 MG/1
25 TABLET, FILM COATED ORAL
Qty: 30 | Refills: 3 | Status: DISCONTINUED | COMMUNITY
Start: 2019-06-14 | End: 2021-03-03

## 2021-03-03 RX ORDER — GABAPENTIN 100 MG/1
100 CAPSULE ORAL DAILY
Qty: 90 | Refills: 0 | Status: ACTIVE | COMMUNITY
Start: 2021-03-03 | End: 1900-01-01

## 2021-03-03 RX ORDER — QUETIAPINE 25 MG/1
25 TABLET, FILM COATED ORAL
Refills: 0 | Status: ACTIVE | COMMUNITY
Start: 2021-03-03

## 2021-03-03 RX ORDER — BUPROPION HYDROCHLORIDE 150 MG/1
150 TABLET, FILM COATED, EXTENDED RELEASE ORAL
Qty: 60 | Refills: 3 | Status: DISCONTINUED | COMMUNITY
Start: 2019-02-15 | End: 2020-11-03

## 2021-03-04 RX ORDER — GABAPENTIN 100 MG/1
100 CAPSULE ORAL DAILY
Qty: 90 | Refills: 3 | Status: ACTIVE | COMMUNITY
Start: 2021-03-03 | End: 1900-01-01

## 2021-03-04 RX ORDER — ACETAMINOPHEN 500 MG/1
500 TABLET ORAL
Qty: 180 | Refills: 5 | Status: ACTIVE | COMMUNITY
Start: 2021-03-03 | End: 1900-01-01

## 2021-03-05 LAB
25(OH)D3 SERPL-MCNC: 37.9 NG/ML
ALBUMIN SERPL ELPH-MCNC: 4.4 G/DL
ALP BLD-CCNC: 136 U/L
ALT SERPL-CCNC: 12 U/L
ANION GAP SERPL CALC-SCNC: 10 MMOL/L
AST SERPL-CCNC: 15 U/L
BASOPHILS # BLD AUTO: 0.04 K/UL
BASOPHILS NFR BLD AUTO: 0.4 %
BILIRUB SERPL-MCNC: 0.4 MG/DL
BUN SERPL-MCNC: 36 MG/DL
CALCIUM SERPL-MCNC: 9.2 MG/DL
CHLORIDE SERPL-SCNC: 107 MMOL/L
CHOLEST SERPL-MCNC: 111 MG/DL
CO2 SERPL-SCNC: 25 MMOL/L
CREAT SERPL-MCNC: 2.36 MG/DL
EOSINOPHIL # BLD AUTO: 0.33 K/UL
EOSINOPHIL NFR BLD AUTO: 3.6 %
ESTIMATED AVERAGE GLUCOSE: 120 MG/DL
GLUCOSE SERPL-MCNC: 115 MG/DL
HBA1C MFR BLD HPLC: 5.8 %
HCT VFR BLD CALC: 31.5 %
HDLC SERPL-MCNC: 33 MG/DL
HGB BLD-MCNC: 10.1 G/DL
IMM GRANULOCYTES NFR BLD AUTO: 0.2 %
LDLC SERPL CALC-MCNC: 37 MG/DL
LYMPHOCYTES # BLD AUTO: 1.65 K/UL
LYMPHOCYTES NFR BLD AUTO: 17.9 %
MAN DIFF?: NORMAL
MCHC RBC-ENTMCNC: 32.1 GM/DL
MCHC RBC-ENTMCNC: 33.1 PG
MCV RBC AUTO: 103.3 FL
MONOCYTES # BLD AUTO: 0.84 K/UL
MONOCYTES NFR BLD AUTO: 9.1 %
NEUTROPHILS # BLD AUTO: 6.34 K/UL
NEUTROPHILS NFR BLD AUTO: 68.8 %
NONHDLC SERPL-MCNC: 78 MG/DL
PLATELET # BLD AUTO: 163 K/UL
POTASSIUM SERPL-SCNC: 4.5 MMOL/L
PROT SERPL-MCNC: 6.9 G/DL
RBC # BLD: 3.05 M/UL
RBC # FLD: 13 %
SODIUM SERPL-SCNC: 141 MMOL/L
TRIGL SERPL-MCNC: 204 MG/DL
TSH SERPL-ACNC: 5.94 UIU/ML
WBC # FLD AUTO: 9.22 K/UL

## 2021-03-05 RX ORDER — GLIPIZIDE 2.5 MG/1
2.5 TABLET, FILM COATED, EXTENDED RELEASE ORAL
Qty: 30 | Refills: 3 | Status: DISCONTINUED | COMMUNITY
Start: 2019-12-13 | End: 2021-03-05

## 2021-03-05 RX ORDER — ACETAMINOPHEN/DIPHENHYDRAMINE 500MG-25MG
1000 TABLET ORAL
Qty: 30 | Refills: 10 | Status: ACTIVE | COMMUNITY
Start: 2021-03-05 | End: 1900-01-01

## 2021-03-05 RX ORDER — FOLIC ACID 1 MG/1
1 TABLET ORAL DAILY
Qty: 30 | Refills: 10 | Status: ACTIVE | COMMUNITY
Start: 2021-03-05 | End: 1900-01-01

## 2021-03-05 NOTE — PHYSICAL EXAM
[General Appearance - Alert] : alert [General Appearance - In No Acute Distress] : in no acute distress [General Appearance - Well Nourished] : well nourished [General Appearance - Well-Appearing] : healthy appearing [Sclera] : the sclera and conjunctiva were normal [PERRL With Normal Accommodation] : pupils were equal in size, round, and reactive to light [Extraocular Movements] : extraocular movements were intact [Auscultation Breath Sounds / Voice Sounds] : lungs were clear to auscultation bilaterally [Heart Rate And Rhythm] : heart rate was normal and rhythm regular [Heart Sounds] : normal S1 and S2 [Murmurs] : no murmurs [Full Pulse] : the pedal pulses are present [Bowel Sounds] : normal bowel sounds [Abdomen Soft] : soft [Abdomen Tenderness] : non-tender [Outer Ear] : the ears and nose were normal in appearance [Neck Appearance] : the appearance of the neck was normal [Respiration, Rhythm And Depth] : normal respiratory rhythm and effort [Exaggerated Use Of Accessory Muscles For Inspiration] : no accessory muscle use [Nail Clubbing] : no clubbing  or cyanosis of the fingernails [Involuntary Movements] : no involuntary movements were seen [Musculoskeletal - Swelling] : no joint swelling seen [Skin Color & Pigmentation] : normal skin color and pigmentation [] : no rash [No Focal Deficits] : no focal deficits [FreeTextEntry1] : Poor memory, poor insight and judgment, depressed mood.

## 2021-03-05 NOTE — REVIEW OF SYSTEMS
[Feeling Poorly] : feeling poorly [Joint Pain] : joint pain [Limb Pain] : limb pain [Confused] : confusion [Depression] : depression [Fever] : no fever [Chills] : no chills [Red Eyes] : eyes not red [Eyesight Problems] : no eyesight problems [Heart Rate Is Fast] : the heart rate was not fast [Chest Pain] : no chest pain [Palpitations] : no palpitations [Shortness Of Breath] : no shortness of breath [Cough] : no cough [SOB on Exertion] : no shortness of breath during exertion [Abdominal Pain] : no abdominal pain [Constipation] : no constipation [Diarrhea] : no diarrhea [Dysuria] : no dysuria [Incontinence] : no incontinence [Nocturia] : no nocturia [Skin Lesions] : no skin lesions [Skin Wound] : no skin wound [Dizziness] : no dizziness [Fainting] : no fainting [Suicidal] : not suicidal [Change In Personality] : no personality change

## 2021-03-05 NOTE — HISTORY OF PRESENT ILLNESS
[PMH Reviewed and Updated] : past medical history reviewed and updated [PSH Reviewed and Updated] : past surgical history reviewed and updated [Family History Reviewed and Updated] : family history reviewed and updated [Medication and Allergies Reconciled] : medication and allergies reconciled [Retired] : retired from work [Extended Family] : extended family [Does not drive] : does not drive [Advanced Directives Discussed] : discussed at today's visit [Compliant with medications] : compliant with medications [de-identified] : Lives in [facility] alone [FreeTextEntry1] : 73 y/o M w/ a PMHx of T2DM, HTN, HLD, and anxiety who presents to clinic for annual exam. Patient is accompanied by his sisters (Amee [phone]) and her . Pt currently resides in Assisted living home at [facility].\par \par DM2: On glipizide. Was tried on trajenta previously but was too expensive.\par \par Patient is in the Memory Unit in his [facility] due to episodes of wandering and safety concerns. Family thinks his memory has worsened over the past year and now he doesn't remember where he is anymore. He will call family 10 times in an hour because he forgets that he called them a few minutes prior. He also says that he isn't being fed and is always hungry. His mood has also worsened significantly and he endorses SI to family over the phone. He denies SI today but does endorse some lower mood but cannot go into details 2/2 dementia.\par \par Pt has also endorsed significant lower back and leg pain to family over the past few months but in clinic denies significant pain. Says it is a "tingling" of his back and legs but can't be more specific.

## 2021-03-05 NOTE — ASSESSMENT
[FreeTextEntry1] : update labwork reivewed:  dm2 a1c 5.8%: goal for geriatric patient with dementia would aim for closer to 8%.\par will discontinue glipizide for concern for hypoglycemic risks.\par \par macrocytic anemia: start b12 folate supplementation\par

## 2021-03-05 NOTE — END OF VISIT
[] : Fellow [FreeTextEntry3] : 75yoM with dementia and depression with worsening of memory and depression since last visit; suspect progression of disease along with uncontrolled depression.  medication adjustments to improve pain management.  plan as detailed above.

## 2021-04-09 ENCOUNTER — APPOINTMENT (OUTPATIENT)
Dept: GERIATRICS | Facility: CLINIC | Age: 76
End: 2021-04-09
Payer: MEDICARE

## 2021-04-09 VITALS
RESPIRATION RATE: 15 BRPM | TEMPERATURE: 98 F | BODY MASS INDEX: 24.78 KG/M2 | HEART RATE: 69 BPM | OXYGEN SATURATION: 98 % | HEIGHT: 73 IN | WEIGHT: 187 LBS | SYSTOLIC BLOOD PRESSURE: 130 MMHG | DIASTOLIC BLOOD PRESSURE: 62 MMHG

## 2021-04-09 DIAGNOSIS — F32.9 ANXIETY DISORDER, UNSPECIFIED: ICD-10-CM

## 2021-04-09 DIAGNOSIS — M54.9 DORSALGIA, UNSPECIFIED: ICD-10-CM

## 2021-04-09 DIAGNOSIS — I10 ESSENTIAL (PRIMARY) HYPERTENSION: ICD-10-CM

## 2021-04-09 DIAGNOSIS — R26.81 UNSTEADINESS ON FEET: ICD-10-CM

## 2021-04-09 DIAGNOSIS — D64.9 ANEMIA, UNSPECIFIED: ICD-10-CM

## 2021-04-09 DIAGNOSIS — F41.9 ANXIETY DISORDER, UNSPECIFIED: ICD-10-CM

## 2021-04-09 DIAGNOSIS — E11.8 TYPE 2 DIABETES MELLITUS WITH UNSPECIFIED COMPLICATIONS: ICD-10-CM

## 2021-04-09 DIAGNOSIS — F03.90 UNSPECIFIED DEMENTIA W/OUT BEHAVIORAL DISTURBANCE: ICD-10-CM

## 2021-04-09 PROCEDURE — 99214 OFFICE O/P EST MOD 30 MIN: CPT

## 2021-04-09 RX ORDER — ACETAMINOPHEN 500 MG/1
500 TABLET, COATED ORAL
Qty: 180 | Refills: 3 | Status: ACTIVE | COMMUNITY
Start: 2021-03-03 | End: 1900-01-01

## 2021-04-09 NOTE — HISTORY OF PRESENT ILLNESS
[FreeTextEntry1] : 76 y/o M w/ a PMHx of T2DM, HTN, HLD, and anxiety who presents to clinic for annual exam. \par \par  Pt currently resides in Assisted living home at [place].\par \par DM2: On glipizide. Was tried on trajenta previously but was too expensive.\par glipizide was discontinued at last visit for a1c of 5.8% too tightly controlled with risk for hypoglycemia.\par \par Patient is in the Memory Unit in his [facility] due to episodes of wandering and safety concerns. Family thinks his memory has worsened over the past year and now he doesn't remember where he is anymore. He will call family often and repeatedly  because he forgets that he called them a few minutes prior. started on wellbutrin at last visit.\par \par Pt has also endorsed significant lower back and leg pain to family over the past few months but in clinic denies significant pain. Says it is a "tingling" of his back and legs but can't be more specific. \par \par \par Podiatry sees patient in facilty every few months\par no recent falls.\par

## 2021-04-09 NOTE — PHYSICAL EXAM
[General Appearance - Alert] : alert [General Appearance - In No Acute Distress] : in no acute distress [General Appearance - Well Nourished] : well nourished [General Appearance - Well-Appearing] : healthy appearing [Sclera] : the sclera and conjunctiva were normal [PERRL With Normal Accommodation] : pupils were equal in size, round, and reactive to light [Extraocular Movements] : extraocular movements were intact [Outer Ear] : the ears and nose were normal in appearance [Neck Appearance] : the appearance of the neck was normal [Respiration, Rhythm And Depth] : normal respiratory rhythm and effort [Exaggerated Use Of Accessory Muscles For Inspiration] : no accessory muscle use [Auscultation Breath Sounds / Voice Sounds] : lungs were clear to auscultation bilaterally [Heart Rate And Rhythm] : heart rate was normal and rhythm regular [Heart Sounds] : normal S1 and S2 [Murmurs] : no murmurs [Full Pulse] : the pedal pulses are present [Bowel Sounds] : normal bowel sounds [Abdomen Soft] : soft [Abdomen Tenderness] : non-tender [Nail Clubbing] : no clubbing  or cyanosis of the fingernails [Involuntary Movements] : no involuntary movements were seen [Musculoskeletal - Swelling] : no joint swelling seen [Skin Color & Pigmentation] : normal skin color and pigmentation [] : no rash [No Focal Deficits] : no focal deficits [FreeTextEntry1] : Poor memory, poor insight and judgment, depressed mood.

## 2021-05-28 RX ORDER — DONEPEZIL HYDROCHLORIDE 10 MG/1
10 TABLET ORAL
Qty: 30 | Refills: 8 | Status: ACTIVE | COMMUNITY
Start: 2019-03-15 | End: 1900-01-01

## 2021-06-03 RX ORDER — GABAPENTIN 300 MG/1
300 CAPSULE ORAL
Qty: 30 | Refills: 8 | Status: ACTIVE | COMMUNITY
Start: 2019-02-15 | End: 1900-01-01

## 2021-06-15 RX ORDER — BUPROPION HYDROCHLORIDE 150 MG/1
150 TABLET, EXTENDED RELEASE ORAL DAILY
Qty: 90 | Refills: 1 | Status: ACTIVE | COMMUNITY
Start: 2021-03-03 | End: 1900-01-01

## 2021-07-21 ENCOUNTER — TRANSCRIPTION ENCOUNTER (OUTPATIENT)
Age: 76
End: 2021-07-21

## 2021-09-24 ENCOUNTER — NON-APPOINTMENT (OUTPATIENT)
Age: 76
End: 2021-09-24

## 2021-12-06 ENCOUNTER — TRANSCRIPTION ENCOUNTER (OUTPATIENT)
Age: 76
End: 2021-12-06

## 2022-04-14 ENCOUNTER — TRANSCRIPTION ENCOUNTER (OUTPATIENT)
Age: 77
End: 2022-04-14